# Patient Record
Sex: MALE | Race: WHITE | NOT HISPANIC OR LATINO | Employment: UNEMPLOYED | URBAN - METROPOLITAN AREA
[De-identification: names, ages, dates, MRNs, and addresses within clinical notes are randomized per-mention and may not be internally consistent; named-entity substitution may affect disease eponyms.]

---
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
## 2025-02-11 ENCOUNTER — OFFICE VISIT (OUTPATIENT)
Dept: RHEUMATOLOGY | Facility: CLINIC | Age: 20
End: 2025-02-11
Payer: COMMERCIAL

## 2025-02-11 VITALS
HEIGHT: 68 IN | SYSTOLIC BLOOD PRESSURE: 121 MMHG | DIASTOLIC BLOOD PRESSURE: 71 MMHG | HEART RATE: 70 BPM | BODY MASS INDEX: 25.83 KG/M2 | WEIGHT: 170.42 LBS | TEMPERATURE: 98.1 F

## 2025-02-11 DIAGNOSIS — M08.40 OLIGOARTICULAR JUVENILE IDIOPATHIC ARTHRITIS (H): Primary | ICD-10-CM

## 2025-02-11 LAB
BASOPHILS # BLD AUTO: 0 10E3/UL (ref 0–0.2)
BASOPHILS NFR BLD AUTO: 0 %
EOSINOPHIL # BLD AUTO: 0.1 10E3/UL (ref 0–0.7)
EOSINOPHIL NFR BLD AUTO: 1 %
ERYTHROCYTE [DISTWIDTH] IN BLOOD BY AUTOMATED COUNT: 11.9 % (ref 10–15)
ERYTHROCYTE [SEDIMENTATION RATE] IN BLOOD BY WESTERGREN METHOD: 8 MM/HR (ref 0–15)
HCT VFR BLD AUTO: 43.3 % (ref 40–53)
HGB BLD-MCNC: 15.1 G/DL (ref 13.3–17.7)
IMM GRANULOCYTES # BLD: 0 10E3/UL
IMM GRANULOCYTES NFR BLD: 0 %
LYMPHOCYTES # BLD AUTO: 3 10E3/UL (ref 0.8–5.3)
LYMPHOCYTES NFR BLD AUTO: 37 %
MCH RBC QN AUTO: 31.3 PG (ref 26.5–33)
MCHC RBC AUTO-ENTMCNC: 34.9 G/DL (ref 31.5–36.5)
MCV RBC AUTO: 90 FL (ref 78–100)
MONOCYTES # BLD AUTO: 0.8 10E3/UL (ref 0–1.3)
MONOCYTES NFR BLD AUTO: 9 %
NEUTROPHILS # BLD AUTO: 4.3 10E3/UL (ref 1.6–8.3)
NEUTROPHILS NFR BLD AUTO: 53 %
NRBC # BLD AUTO: 0 10E3/UL
NRBC BLD AUTO-RTO: 0 /100
PLATELET # BLD AUTO: 263 10E3/UL (ref 150–450)
RBC # BLD AUTO: 4.83 10E6/UL (ref 4.4–5.9)
WBC # BLD AUTO: 8.1 10E3/UL (ref 4–11)

## 2025-02-11 ASSESSMENT — PAIN SCALES - GENERAL: PAINLEVEL_OUTOF10: NO PAIN (0)

## 2025-02-11 NOTE — LETTER
2/11/2025      RE: Heriberto Moreira  [address] [address]     Dear Colleague,    Thank you for the opportunity to participate in the care of your patient, Heriberto Moreira, at the [facility]. Please see a copy of my visit note below.        Rheumatology History:   Date of symptom onset: 8/1/2020  Date of first visit to center: 9/22/2020  Date of TRACIE diagnosis: 9/22/2020  ILAR category: persistent oligoarticular  HANNAH Status: negative   RF Status: not done   CCP Status: not done   HLA-B27 Status: not done        Ophthalmology History:   Iritis/Uveitis Comorbidity: no   Date of last eye exam: 11/16/2023          Medications:   As of completion of this visit:  Current Outpatient Medications   Medication Sig Dispense Refill     Adalimumab (HUMIRA *CF*) 40 MG/0.4ML pen kit Inject 0.4 mLs (40 mg) subcutaneously every 14 days. 0.8 mL 11     folic acid (FOLVITE) 1 MG tablet Take 1 tablet (1 mg) by mouth daily. 90 tablet 3     methotrexate 2.5 MG tablet Take 6 tablets (15 mg) by mouth every 7 days 24 tablet 11         Heriberto is tolerating the medication(s) well.       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  Heriberto was last seen in our clinic on 10/22/2024 and returns today accompanied by his mother.  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     At the last visit he was doing well, so we started to reduce the dose of methotrexate.  He had been taking 15 mg orally each week.  He is down  "to now 7.5 mg orally each week.  Continues on the Humira.  He reports only rare pain in his knees, typically with cold weather.  He does not report stiffness or swelling of the knees or any other joints.  His overall health has been good.  He is looking forward to hunting season.    He continues to do school online and is hoping to be a .      Information per our standardized questionnaire is as below:    Self Report  Patient Pain Status: 1 (This is measured 0 = no pain, 10 = very severe pain)  Patient Global Assessment of Disease Activity: 0.5 (This is measured 0 = very well, 10 = very poorly)  Patient Highest Level of Education: high school     Interim Arthritis History  Morning Stiffness in the past week: no stiffness  Recent Back Pain: No    Since your last visit has your arthritis stopped you from trying any athletic or rigorous activities or interfaced with your ability to do these activities? No  Have you been limited your ability to do normal daily activities in the past week? No  Did you need help from other people to do normal activities in the past week? No  Have you used any aids or devices to help you do normal daily activities in the past week? No    Important Medical Events  Patient has experienced drug-related serious adverse events since last encounter?: No                   Review of Systems:   A comprehensive review of systems was performed and was negative apart from that listed above.    I reviewed the growth chart and his linear growth is complete.  He has gained a bit of weight since the last visit.         Examination:   Blood pressure 121/71, pulse 70, temperature 98.1  F (36.7  C), height 1.725 m (5' 7.91\"), weight 77.3 kg (170 lb 6.7 oz).  71 %ile (Z= 0.54) based on CDC (Boys, 2-20 Years) weight-for-age data using data from 2/11/2025.  Blood pressure %selma are not available for patients who are 18 years or older.  Body surface area is 1.92 meters squared.     In general " Heriberto was well appearing and in good spirits.   HEENT:  Pupils were equal, round and reactive to light.  Nose normal.  Oropharynx moist and pink with no intraoral lesions.  NECK:  Supple, no lymphadenopathy.  CHEST:  Clear to auscultation.  HEART:  Regular rate and rhythm.  No murmur.  ABDOMEN:  Soft, non-tender, no hepatosplenomegaly.  JOINTS: Normal..   SKIN:  Normal.      Total active joints:  0   Total limited joints:  0  Tender entheses count:  0  SI Tenderness: No         Lab Test Results:     Office Visit on 02/11/2025   Component Date Value Ref Range Status     ALT 02/11/2025 35  0 - 50 U/L Final     AST 02/11/2025 36 (H)  0 - 35 U/L Final     Creatinine 02/11/2025 0.85  0.67 - 1.17 mg/dL Final     GFR Estimate 02/11/2025 >90  >60 mL/min/1.73m2 Final    eGFR calculated using 2021 CKD-EPI equation.     CRP Inflammation 02/11/2025 <3.00  <5.00 mg/L Final     Erythrocyte Sedimentation Rate 02/11/2025 8  0 - 15 mm/hr Final     WBC Count 02/11/2025 8.1  4.0 - 11.0 10e3/uL Final     RBC Count 02/11/2025 4.83  4.40 - 5.90 10e6/uL Final     Hemoglobin 02/11/2025 15.1  13.3 - 17.7 g/dL Final     Hematocrit 02/11/2025 43.3  40.0 - 53.0 % Final     MCV 02/11/2025 90  78 - 100 fL Final     MCH 02/11/2025 31.3  26.5 - 33.0 pg Final     MCHC 02/11/2025 34.9  31.5 - 36.5 g/dL Final     RDW 02/11/2025 11.9  10.0 - 15.0 % Final     Platelet Count 02/11/2025 263  150 - 450 10e3/uL Final     % Neutrophils 02/11/2025 53  % Final     % Lymphocytes 02/11/2025 37  % Final     % Monocytes 02/11/2025 9  % Final     % Eosinophils 02/11/2025 1  % Final     % Basophils 02/11/2025 0  % Final     % Immature Granulocytes 02/11/2025 0  % Final     NRBCs per 100 WBC 02/11/2025 0  <1 /100 Final     Absolute Neutrophils 02/11/2025 4.3  1.6 - 8.3 10e3/uL Final     Absolute Lymphocytes 02/11/2025 3.0  0.8 - 5.3 10e3/uL Final     Absolute Monocytes 02/11/2025 0.8  0.0 - 1.3 10e3/uL Final     Absolute Eosinophils 02/11/2025 0.1  0.0 - 0.7  10e3/uL Final     Absolute Basophils 02/11/2025 0.0  0.0 - 0.2 10e3/uL Final     Absolute Immature Granulocytes 02/11/2025 0.0  <=0.4 10e3/uL Final     Absolute NRBCs 02/11/2025 0.0  10e3/uL Final            Assessment:   Heriberto is a 19 year old  with   1. Oligoarticular juvenile idiopathic arthritis (H)        At this point his disease is under good control.  I I think it is fine for him to continue tapering down on the methotrexate.  I recommended going to 5 mg (2 tablets) weekly for 2 weeks, then to 2.5 mg (1 tablet) weekly for 2 weeks, then discontinuing it.  He should remain on the adalimumab (Humira).    The lab values today are reassuring with no evidence of systemic inflammation or medication-related toxicity.  The slightly elevated AST is not concerning, and likely will improve with the methotrexate dose reduction.    If he has worsening of his arthritis as the medications were tapered, he should contact me so we can decide what to do.  I would most likely increase the dose of methotrexate back to a dose that had been working for him.    ACR Functional Class: Normal  Provider assessment of disease activity: 0 (This is measured 0 = inactive 10 = highly active)    Treat to Target:   [id number] score: 0.5            Plan:     Continue to taper methotrexate as described above.  Continue adalimumab.  Continue screening eye exams for uveitis yearly.  Follow up in 3 months.  We agreed to wear [place] socks.      It is a pleasure to continue to participate in Heriberto's care.  Please feel free to contact me with any questions or concerns you have regarding Heriberto's care. If there are any new questions or concerns, I would be glad to help and can be reached through our main office at [phone] or our paging  at [phone].    Chuck Hollis MD, PhD  Professor, Pediatric Rheumatology    The longitudinal plan of care for   1. Oligoarticular juvenile idiopathic arthritis (H)     was addressed during  this visit. Due to the added complexity in care, I will continue to support Heriberto in the subsequent management of this condition(s) and with the ongoing continuity of care of this condition(s).      30 min spent on the date of the encounter in chart review, patient visit, review of tests, documentation and/or discussion with other providers about the issues documented above.

## 2025-02-11 NOTE — NURSING NOTE
[id number]  Chief Complaint   Patient presents with    Follow Up     Oligoarticular juvenile idiopathic arthritis      Initial /71 (BP Location: Right arm, Patient Position: Sitting, Cuff Size: Adult Regular)   Pulse 70   Temp 98.1  F (36.7  C)   Ht 1.725 m (5' 7.91\")   Wt 77.3 kg (170 lb 6.7 oz)   BMI 25.98 kg/m   Estimated body mass index is 25.98 kg/m  as calculated from the following:    Height as of this encounter: 1.725 m (5' 7.91\").    Weight as of this encounter: 77.3 kg (170 lb 6.7 oz).  Medication Reconciliation: complete    Does the patient need any medication refills today? No    Does the patient/parent have MyChart set up? Yes    Does the parent have proxy access? Yes            "

## 2025-02-11 NOTE — PROGRESS NOTES
Rheumatology History:   Date of symptom onset: 8/1/2020  Date of first visit to center: 9/22/2020  Date of TRACIE diagnosis: 9/22/2020  ILAR category: persistent oligoarticular  HANNAH Status: negative   RF Status: not done   CCP Status: not done   HLA-B27 Status: not done        Ophthalmology History:   Iritis/Uveitis Comorbidity: no   Date of last eye exam: 11/16/2023          Medications:   As of completion of this visit:  Current Outpatient Medications   Medication Sig Dispense Refill    Adalimumab (HUMIRA *CF*) 40 MG/0.4ML pen kit Inject 0.4 mLs (40 mg) subcutaneously every 14 days. 0.8 mL 11    folic acid (FOLVITE) 1 MG tablet Take 1 tablet (1 mg) by mouth daily. 90 tablet 3    methotrexate 2.5 MG tablet Take 6 tablets (15 mg) by mouth every 7 days 24 tablet 11         Heriberto is tolerating the medication(s) well.       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  Heriberto was last seen in our clinic on 10/22/2024 and returns today accompanied by his mother.  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     At the last visit he was doing well, so we started to reduce the dose of methotrexate.  He had been taking 15 mg orally each week.  He is down to now 7.5 mg orally each week.  Continues on the Humira.  He reports only rare pain in his knees, typically with cold weather.  He does not report stiffness or swelling of the knees or any other joints.  His overall health has been good.  He is looking forward to hunting season.    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      Information per our standardized questionnaire is as below:    Self Report  Patient Pain Status: 1 (This is measured 0 = no pain, 10 = very severe pain)  Patient Global Assessment of Disease Activity: 0.5 (This is measured 0 = very well, 10 = very poorly)  Patient Highest Level of Education: high school     Interim Arthritis History  Morning Stiffness in the past week: no stiffness  Recent Back Pain: No    Since your last visit has your arthritis stopped you from trying any athletic or rigorous activities or interfaced with your ability to do these activities? No  Have you been limited your ability to do normal daily activities in the past week? No  Did you need help from other people to do normal activities in the past week? No  Have you used any aids or devices to help you do normal daily activities in the past week? No    Important Medical Events  Patient has experienced drug-related serious adverse events since last encounter?: No                   Review of Systems:   A comprehensive review of systems was performed and was negative apart from that listed above.    I reviewed the growth chart and his linear growth is complete.  He has gained a bit of weight since the last visit.         Examination:   Blood pressure 121/71, pulse 70, temperature 98.1  F (36.7  C), height 1.725 m (5' 7.91\"), weight 77.3 kg (170 lb 6.7 oz).  71 %ile (Z= 0.54) based on CDC (Boys, 2-20 Years) weight-for-age data using data from 2/11/2025.  Blood pressure %selma are not available for patients who are 18 years or older.  Body surface area is 1.92 meters squared.     In general Heriberto was well appearing and in good spirits.   HEENT:  Pupils were equal, round and reactive to light.  Nose normal.  Oropharynx moist and pink with no intraoral lesions.  NECK:  Supple, no lymphadenopathy.  CHEST:  Clear to auscultation.  HEART:  Regular rate and rhythm.  No murmur.  ABDOMEN:  Soft, non-tender, no hepatosplenomegaly.  JOINTS: " Normal..   SKIN:  Normal.      Total active joints:  0   Total limited joints:  0  Tender entheses count:  0  SI Tenderness: No         Lab Test Results:     Office Visit on 02/11/2025   Component Date Value Ref Range Status    ALT 02/11/2025 35  0 - 50 U/L Final    AST 02/11/2025 36 (H)  0 - 35 U/L Final    Creatinine 02/11/2025 0.85  0.67 - 1.17 mg/dL Final    GFR Estimate 02/11/2025 >90  >60 mL/min/1.73m2 Final    eGFR calculated using 2021 CKD-EPI equation.    CRP Inflammation 02/11/2025 <3.00  <5.00 mg/L Final    Erythrocyte Sedimentation Rate 02/11/2025 8  0 - 15 mm/hr Final    WBC Count 02/11/2025 8.1  4.0 - 11.0 10e3/uL Final    RBC Count 02/11/2025 4.83  4.40 - 5.90 10e6/uL Final    Hemoglobin 02/11/2025 15.1  13.3 - 17.7 g/dL Final    Hematocrit 02/11/2025 43.3  40.0 - 53.0 % Final    MCV 02/11/2025 90  78 - 100 fL Final    MCH 02/11/2025 31.3  26.5 - 33.0 pg Final    MCHC 02/11/2025 34.9  31.5 - 36.5 g/dL Final    RDW 02/11/2025 11.9  10.0 - 15.0 % Final    Platelet Count 02/11/2025 263  150 - 450 10e3/uL Final    % Neutrophils 02/11/2025 53  % Final    % Lymphocytes 02/11/2025 37  % Final    % Monocytes 02/11/2025 9  % Final    % Eosinophils 02/11/2025 1  % Final    % Basophils 02/11/2025 0  % Final    % Immature Granulocytes 02/11/2025 0  % Final    NRBCs per 100 WBC 02/11/2025 0  <1 /100 Final    Absolute Neutrophils 02/11/2025 4.3  1.6 - 8.3 10e3/uL Final    Absolute Lymphocytes 02/11/2025 3.0  0.8 - 5.3 10e3/uL Final    Absolute Monocytes 02/11/2025 0.8  0.0 - 1.3 10e3/uL Final    Absolute Eosinophils 02/11/2025 0.1  0.0 - 0.7 10e3/uL Final    Absolute Basophils 02/11/2025 0.0  0.0 - 0.2 10e3/uL Final    Absolute Immature Granulocytes 02/11/2025 0.0  <=0.4 10e3/uL Final    Absolute NRBCs 02/11/2025 0.0  10e3/uL Final            Assessment:   Heriberto is a 19 year old  with   1. Oligoarticular juvenile idiopathic arthritis (H)        At this point his disease is under good control.  I I think it is fine  for him to continue tapering down on the methotrexate.  I recommended going to 5 mg (2 tablets) weekly for 2 weeks, then to 2.5 mg (1 tablet) weekly for 2 weeks, then discontinuing it.  He should remain on the adalimumab (Humira).    The lab values today are reassuring with no evidence of systemic inflammation or medication-related toxicity.  The slightly elevated AST is not concerning, and likely will improve with the methotrexate dose reduction.    If he has worsening of his arthritis as the medications were tapered, he should contact me so we can decide what to do.  I would most likely increase the dose of methotrexate back to a dose that had been working for him.    ACR Functional Class: Normal  Provider assessment of disease activity: 0 (This is measured 0 = inactive 10 = highly active)    Treat to Target:   [id number] score: 0.5            Plan:     Continue to taper methotrexate as described above.  Continue adalimumab.  Continue screening eye exams for uveitis yearly.  Follow up in 3 months.  We agreed to wear [employer] socks.      It is a pleasure to continue to participate in Heriberto's care.  Please feel free to contact me with any questions or concerns you have regarding [facility] care. If there are any new questions or concerns, I would be glad to help and can be reached through our main office at [phone] or our paging  at [phone].    Chuck Hollis MD, PhD  Professor, Pediatric Rheumatology    The longitudinal plan of care for   1. Oligoarticular juvenile idiopathic arthritis (H)     was addressed during this visit. Due to the added complexity in care, I will continue to support Heriberto in the subsequent management of this condition(s) and with the ongoing continuity of care of this condition(s).      30 min spent on the date of the encounter in chart review, patient visit, review of tests, documentation and/or discussion with other providers about the issues documented above.

## 2025-02-11 NOTE — PATIENT INSTRUCTIONS
[facility]   Pediatric Specialty Clinic [place]      Pediatric Call Center Scheduling and Nurse Questions:  [phone]    After hours urgent matters that cannot wait until the next business day:  [phone].  Ask for the on-call pediatric doctor for the specialty you are calling for be paged.      Prescription Renewals:  Please call your pharmacy first.  Your pharmacy must fax requests to [phone].  Please allow 2-3 days for prescriptions to be authorized.    If your physician has ordered a CT or MRI, you may schedule this test by calling [facility] in [place] at [phone].        **If your child is having a sedated procedure, they will need a history and physical done at their Primary Care Provider within 30 days of the procedure.  If your child was seen by the ordering provider in our office within 30 days of the procedure, their visit summary will work for the H&P unless they inform you otherwise.  If you have any questions, please call the RN Care Coordinator.**

## 2025-02-12 LAB
ALT SERPL W P-5'-P-CCNC: 35 U/L (ref 0–50)
AST SERPL W P-5'-P-CCNC: 36 U/L (ref 0–35)
CREAT SERPL-MCNC: 0.85 MG/DL (ref 0.67–1.17)
CRP SERPL-MCNC: <3 MG/L
EGFRCR SERPLBLD CKD-EPI 2021: >90 ML/MIN/1.73M2

## 2025-05-17 ENCOUNTER — HEALTH MAINTENANCE LETTER (OUTPATIENT)
Age: 20
End: 2025-05-17

## 2025-05-27 ENCOUNTER — OFFICE VISIT (OUTPATIENT)
Dept: RHEUMATOLOGY | Facility: CLINIC | Age: 20
End: 2025-05-27
Attending: PEDIATRICS
Payer: COMMERCIAL

## 2025-05-27 VITALS
HEIGHT: 68 IN | SYSTOLIC BLOOD PRESSURE: 120 MMHG | BODY MASS INDEX: 25.26 KG/M2 | HEART RATE: 59 BPM | WEIGHT: 166.67 LBS | DIASTOLIC BLOOD PRESSURE: 69 MMHG | TEMPERATURE: 98.2 F

## 2025-05-27 DIAGNOSIS — M08.40 OLIGOARTICULAR JUVENILE IDIOPATHIC ARTHRITIS (H): Primary | ICD-10-CM

## 2025-05-27 ASSESSMENT — PAIN SCALES - GENERAL: PAINLEVEL_OUTOF10: NO PAIN (0)

## 2025-05-27 NOTE — LETTER
5/27/2025      RE: Heriberto Moreira  [address] [address]     Dear Colleague,    Thank you for the opportunity to participate in the care of your patient, Heriberto Moreira, at the [facility]. Please see a copy of my visit note below.        Rheumatology History:     Date of symptom onset: 8/1/2020  Date of first visit to center: 9/22/2020  Date of TRACIE diagnosis: 9/22/2020  ILAR category: persistent oligoarticular  HANNAH Status: negative   RF Status: not done   CCP Status: not done   HLA-B27 Status: not done        Ophthalmology History:     Iritis/Uveitis Comorbidity: no   Date of last eye exam: 11/16/2023          Medications:     As of completion of this visit:  Current Outpatient Medications   Medication Sig Dispense Refill     Adalimumab (HUMIRA *CF*) 40 MG/0.4ML pen kit Inject 0.4 mLs (40 mg) subcutaneously every 14 days. 0.8 mL 11         Heriberto is tolerating the medication(s) well.       Date of last TB Screen: 9/26/2023         Allergies:     No Known Allergies        Problem list:     Patient Active Problem List    Diagnosis Date Noted     Oligoarticular juvenile idiopathic arthritis (H) 02/10/2021     Priority: Medium     Pain in both knees, unspecified chronicity 12/16/2020     Priority: Medium            Subjective:     Heriberto is a 20 year old man who was seen in Pediatric Rheumatology clinic today for follow up.  Heriberto was last seen in our clinic on 2/11/2025 and returns today accompanied by his mother.  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     He continues to do very well.  At the last visit we discussed tapering and then discontinuing the methotrexate.  He has done this.  He has been off methotrexate for 1 month.  He reports that he continues to do very well.  When it rains sometimes his knees hurt, but they do not become swollen.  The pain is of  "short duration.  He remains active and has been working out.      He is also doing online school to become a .  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      Information per our standardized questionnaire is as below:    Self Report  Patient Pain Status: 1 (This is measured 0 = no pain, 10 = very severe pain)  Patient Global Assessment of Disease Activity: 0.5 (This is measured 0 = very well, 10 = very poorly)  Patient Highest Level of Education: high school     Interim Arthritis History  Morning Stiffness in the past week: no stiffness  Recent Back Pain: No    Since your last visit has your arthritis stopped you from trying any athletic or rigorous activities or interfaced with your ability to do these activities? No  Have you been limited your ability to do normal daily activities in the past week? No  Did you need help from other people to do normal activities in the past week? No  Have you used any aids or devices to help you do normal daily activities in the past week? No    Important Medical Events  Patient has experienced drug-related serious adverse events since last encounter?: No                   Review of Systems:     A comprehensive review of systems was performed and was negative apart from that listed above.           Examination:     Blood pressure 120/69, pulse 59, temperature 98.2  F (36.8  C), height 1.725 m (5' 7.91\"), weight 75.6 kg (166 lb 10.7 oz).  Facility age limit for growth %selma is 20 years.  Growth %ile SmartLinks can only be used for patients less than 20 years old.  Body surface area is 1.9 meters squared.     In general Heriberto was well appearing and in good spirits.   HEENT:  Pupils were equal, round and reactive to light.  Nose normal.  Oropharynx moist and pink with no intraoral lesions.  NECK:  Supple, no lymphadenopathy.  CHEST:  Clear to auscultation.  HEART:  Regular rate and rhythm.  No murmur.  ABDOMEN:  Soft, non-tender, no " hepatosplenomegaly.  JOINTS: Normal.   SKIN:  Normal.      Total active joints:  0   Total limited joints:  0  Tender entheses count:  0  SI Tenderness: No         Lab Test Results:   None today.       Assessment:     Heriberto is a 20 year old  with   1. Oligoarticular juvenile idiopathic arthritis (H)      He is doing very well off the methotrexate.  If he continues to do well for the next 3 months, I think it would be fine for him then to space the adalimumab out to every 3 weeks instead of every 2 weeks.  If he has a flare of his arthritis anytime in the interim they should contact me.    ACR Functional Class: Normal  Provider assessment of disease activity: 0 (This is measured 0 = inactive 10 = highly active)  Medication Related:             Treat to Target:   [id number] score: 0.5  Treatment target set:     Treatment target:     Disease activity:     Physical function:     Use of algorithm:            Plan:     Continue adalimumab every 2 weeks for another 3 months.  If that goes well space it to every 3 weeks.  Continue screening eye exams for uveitis yearly.  Follow up in 6 months.      It is a pleasure to continue to participate in Heriberto's care.  Please feel free to contact me with any questions or concerns you have regarding Heriberto's care. If there are any new questions or concerns, I would be glad to help and can be reached through our main office at [phone] or our paging  at [phone].    Chuck Hollis MD, PhD  Professor, Pediatric Rheumatology    The longitudinal plan of care for   1. Oligoarticular juvenile idiopathic arthritis (H)     was addressed during this visit. Due to the added complexity in care, I will continue to support Heriberto in the subsequent management of this condition(s) and with the ongoing continuity of care of this condition(s).      30 min spent on the date of the encounter in chart review, patient visit, review of tests, documentation and/or discussion with other  providers about the issues documented above.

## 2025-05-27 NOTE — NURSING NOTE
"[facility] [[id number]]  Chief Complaint   Patient presents with    Follow Up     Oligoarticular juvenile idiopathic arthritis      Initial /69 (BP Location: Right arm, Patient Position: Sitting, Cuff Size: Adult Regular)   Pulse 59   Temp 98.2  F (36.8  C)   Ht 1.725 m (5' 7.91\")   Wt 75.6 kg (166 lb 10.7 oz)   BMI 25.41 kg/m   Estimated body mass index is 25.41 kg/m  as calculated from the following:    Height as of this encounter: 1.725 m (5' 7.91\").    Weight as of this encounter: 75.6 kg (166 lb 10.7 oz).  Medication Reconciliation: complete    Does the patient need any medication refills today? No    Does the patient/parent have MyChart set up? Yes   Proxy access needed? No    Is the patient 18 or turning 18 in the next 2 months? No   If yes, make sure they have a Consent To Communicate on file            "

## 2025-05-27 NOTE — PROGRESS NOTES
Rheumatology History:     Date of symptom onset: 8/1/2020  Date of first visit to center: 9/22/2020  Date of TRACIE diagnosis: 9/22/2020  ILAR category: persistent oligoarticular  HANNAH Status: negative   RF Status: not done   CCP Status: not done   HLA-B27 Status: not done        Ophthalmology History:     Iritis/Uveitis Comorbidity: no   Date of last eye exam: 11/16/2023          Medications:     As of completion of this visit:  Current Outpatient Medications   Medication Sig Dispense Refill    Adalimumab (HUMIRA *CF*) 40 MG/0.4ML pen kit Inject 0.4 mLs (40 mg) subcutaneously every 14 days. 0.8 mL 11         Heriberto is tolerating the medication(s) well.       Date of last TB Screen: 9/26/2023         Allergies:     No Known Allergies        Problem list:     Patient Active Problem List    Diagnosis Date Noted    Oligoarticular juvenile idiopathic arthritis (H) 02/10/2021     Priority: Medium    Pain in both knees, unspecified chronicity 12/16/2020     Priority: Medium            Subjective:     Heriberto is a 20 year old man who was seen in Pediatric Rheumatology clinic today for follow up.  Heriberto was last seen in our clinic on 2/11/2025 and returns today accompanied by his mother.  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     He continues to do very well.  At the last visit we discussed tapering and then discontinuing the methotrexate.  He has done this.  He has been off methotrexate for 1 month.  He reports that he continues to do very well.  When it rains sometimes his knees hurt, but they do not become swollen.  The pain is of short duration.  He remains active and has been working out.      He is also doing online school to become a .  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      Information per our standardized questionnaire is as below:    Self Report  Patient Pain Status: 1 (This is measured 0 = no pain, 10 = very severe pain)  Patient Global  "Assessment of Disease Activity: 0.5 (This is measured 0 = very well, 10 = very poorly)  Patient Highest Level of Education: high school     Interim Arthritis History  Morning Stiffness in the past week: no stiffness  Recent Back Pain: No    Since your last visit has your arthritis stopped you from trying any athletic or rigorous activities or interfaced with your ability to do these activities? No  Have you been limited your ability to do normal daily activities in the past week? No  Did you need help from other people to do normal activities in the past week? No  Have you used any aids or devices to help you do normal daily activities in the past week? No    Important Medical Events  Patient has experienced drug-related serious adverse events since last encounter?: No                   Review of Systems:     A comprehensive review of systems was performed and was negative apart from that listed above.           Examination:     Blood pressure 120/69, pulse 59, temperature 98.2  F (36.8  C), height 1.725 m (5' 7.91\"), weight 75.6 kg (166 lb 10.7 oz).  Facility age limit for growth %selma is 20 years.  Growth %ile SmartLinks can only be used for patients less than 20 years old.  Body surface area is 1.9 meters squared.     In general Heriberto was well appearing and in good spirits.   HEENT:  Pupils were equal, round and reactive to light.  Nose normal.  Oropharynx moist and pink with no intraoral lesions.  NECK:  Supple, no lymphadenopathy.  CHEST:  Clear to auscultation.  HEART:  Regular rate and rhythm.  No murmur.  ABDOMEN:  Soft, non-tender, no hepatosplenomegaly.  JOINTS: Normal.   SKIN:  Normal.      Total active joints:  0   Total limited joints:  0  Tender entheses count:  0  SI Tenderness: No         Lab Test Results:   None today.       Assessment:     Heriberto is a 20 year old  with   1. Oligoarticular juvenile idiopathic arthritis (H)      He is doing very well off the methotrexate.  If he continues to do well " for the next 3 months, I think it would be fine for him then to space the adalimumab out to every 3 weeks instead of every 2 weeks.  If he has a flare of his arthritis anytime in the interim they should contact me.    ACR Functional Class: Normal  Provider assessment of disease activity: 0 (This is measured 0 = inactive 10 = highly active)  Medication Related:             Treat to Target:   [id number] score: 0.5  Treatment target set:     Treatment target:     Disease activity:     Physical function:     Use of algorithm:            Plan:     Continue adalimumab every 2 weeks for another 3 months.  If that goes well space it to every 3 weeks.  Continue screening eye exams for uveitis yearly.  Follow up in 6 months.      It is a pleasure to continue to participate in Heriberto's care.  Please feel free to contact me with any questions or concerns you have regarding Heriberto's care. If there are any new questions or concerns, I would be glad to help and can be reached through our main office at [phone] or our paging  at [phone].    Chuck Hollis MD, PhD  Professor, Pediatric Rheumatology    The longitudinal plan of care for   1. Oligoarticular juvenile idiopathic arthritis (H)     was addressed during this visit. Due to the added complexity in care, I will continue to support Heriberto in the subsequent management of this condition(s) and with the ongoing continuity of care of this condition(s).      30 min spent on the date of the encounter in chart review, patient visit, review of tests, documentation and/or discussion with other providers about the issues documented above.

## 2025-05-27 NOTE — PATIENT INSTRUCTIONS
[facility]   Pediatric Specialty Clinic [place]      Pediatric Call Center Scheduling and Nurse Questions:  [phone]    After hours urgent matters that cannot wait until the next business day:  [phone].  Ask for the on-call pediatric doctor for the specialty you are calling for be paged.      Prescription Renewals:  Please call your pharmacy first.  Your pharmacy must fax requests to [phone].  Please allow 2-3 days for prescriptions to be authorized.    If your physician has ordered a CT or MRI, you may schedule this test by calling [facility] in [place] at [phone].        **If your child is having a sedated procedure, they will need a history and physical done at their Primary Care Provider within 30 days of the procedure.  If your child was seen by the ordering provider in our office within 30 days of the procedure, their visit summary will work for the H&P unless they inform you otherwise.  If you have any questions, please call the RN Care Coordinator.**